# Patient Record
Sex: MALE | Race: ASIAN | NOT HISPANIC OR LATINO | Employment: OTHER | URBAN - METROPOLITAN AREA
[De-identification: names, ages, dates, MRNs, and addresses within clinical notes are randomized per-mention and may not be internally consistent; named-entity substitution may affect disease eponyms.]

---

## 2019-08-23 ENCOUNTER — OFFICE VISIT - HEALTHEAST (OUTPATIENT)
Dept: FAMILY MEDICINE | Facility: CLINIC | Age: 59
End: 2019-08-23

## 2019-08-23 DIAGNOSIS — I25.10 CORONARY ARTERY DISEASE INVOLVING NATIVE CORONARY ARTERY OF NATIVE HEART WITHOUT ANGINA PECTORIS: ICD-10-CM

## 2019-08-23 DIAGNOSIS — R03.0 ELEVATED BP WITHOUT DIAGNOSIS OF HYPERTENSION: ICD-10-CM

## 2019-08-23 DIAGNOSIS — E66.3 OVERWEIGHT: ICD-10-CM

## 2019-08-23 DIAGNOSIS — R73.09 ELEVATED GLUCOSE: ICD-10-CM

## 2019-08-23 DIAGNOSIS — I21.4 MI, ACUTE, NON ST SEGMENT ELEVATION (H): ICD-10-CM

## 2019-08-23 LAB
ALBUMIN SERPL-MCNC: 4 G/DL (ref 3.5–5)
ALP SERPL-CCNC: 86 U/L (ref 45–120)
ALT SERPL W P-5'-P-CCNC: 36 U/L (ref 0–45)
ANION GAP SERPL CALCULATED.3IONS-SCNC: 13 MMOL/L (ref 5–18)
AST SERPL W P-5'-P-CCNC: 21 U/L (ref 0–40)
BILIRUB SERPL-MCNC: 1 MG/DL (ref 0–1)
BUN SERPL-MCNC: 32 MG/DL (ref 8–22)
CALCIUM SERPL-MCNC: 9 MG/DL (ref 8.5–10.5)
CHLORIDE BLD-SCNC: 109 MMOL/L (ref 98–107)
CO2 SERPL-SCNC: 21 MMOL/L (ref 22–31)
CREAT SERPL-MCNC: 1.32 MG/DL (ref 0.7–1.3)
ERYTHROCYTE [DISTWIDTH] IN BLOOD BY AUTOMATED COUNT: 10.8 % (ref 11–14.5)
GFR SERPL CREATININE-BSD FRML MDRD: 55 ML/MIN/1.73M2
GLUCOSE BLD-MCNC: 125 MG/DL (ref 70–125)
HBA1C MFR BLD: 6.4 % (ref 3.5–6)
HCT VFR BLD AUTO: 45.8 % (ref 40–54)
HGB BLD-MCNC: 15.4 G/DL (ref 14–18)
MCH RBC QN AUTO: 31.6 PG (ref 27–34)
MCHC RBC AUTO-ENTMCNC: 33.7 G/DL (ref 32–36)
MCV RBC AUTO: 94 FL (ref 80–100)
PLATELET # BLD AUTO: 231 THOU/UL (ref 140–440)
PMV BLD AUTO: 7.2 FL (ref 7–10)
POTASSIUM BLD-SCNC: 4.5 MMOL/L (ref 3.5–5)
PROT SERPL-MCNC: 7.2 G/DL (ref 6–8)
RBC # BLD AUTO: 4.88 MILL/UL (ref 4.4–6.2)
SODIUM SERPL-SCNC: 143 MMOL/L (ref 136–145)
WBC: 7.4 THOU/UL (ref 4–11)

## 2019-08-23 ASSESSMENT — MIFFLIN-ST. JEOR: SCORE: 1387.55

## 2019-08-26 ENCOUNTER — COMMUNICATION - HEALTHEAST (OUTPATIENT)
Dept: FAMILY MEDICINE | Facility: CLINIC | Age: 59
End: 2019-08-26

## 2019-09-04 ENCOUNTER — OFFICE VISIT - HEALTHEAST (OUTPATIENT)
Dept: INTERNAL MEDICINE | Facility: CLINIC | Age: 59
End: 2019-09-04

## 2019-09-04 DIAGNOSIS — I25.10 CORONARY ARTERY DISEASE INVOLVING NATIVE CORONARY ARTERY OF NATIVE HEART WITHOUT ANGINA PECTORIS: ICD-10-CM

## 2019-09-04 DIAGNOSIS — G43.109 MIGRAINE WITH AURA AND WITHOUT STATUS MIGRAINOSUS, NOT INTRACTABLE: ICD-10-CM

## 2019-09-04 DIAGNOSIS — K21.9 GASTROESOPHAGEAL REFLUX DISEASE WITHOUT ESOPHAGITIS: ICD-10-CM

## 2019-09-04 DIAGNOSIS — Z12.11 SCREEN FOR COLON CANCER: ICD-10-CM

## 2019-09-04 DIAGNOSIS — E78.5 HYPERLIPIDEMIA, UNSPECIFIED HYPERLIPIDEMIA TYPE: ICD-10-CM

## 2019-09-04 DIAGNOSIS — E74.39 GLUCOSE INTOLERANCE: ICD-10-CM

## 2019-09-04 DIAGNOSIS — R03.0 ELEVATED BP WITHOUT DIAGNOSIS OF HYPERTENSION: ICD-10-CM

## 2019-09-04 LAB
ALBUMIN SERPL-MCNC: 3.8 G/DL (ref 3.5–5)
ALP SERPL-CCNC: 83 U/L (ref 45–120)
ALT SERPL W P-5'-P-CCNC: 36 U/L (ref 0–45)
ANION GAP SERPL CALCULATED.3IONS-SCNC: 10 MMOL/L (ref 5–18)
AST SERPL W P-5'-P-CCNC: 25 U/L (ref 0–40)
BILIRUB SERPL-MCNC: 1.3 MG/DL (ref 0–1)
BUN SERPL-MCNC: 22 MG/DL (ref 8–22)
CALCIUM SERPL-MCNC: 9.2 MG/DL (ref 8.5–10.5)
CHLORIDE BLD-SCNC: 107 MMOL/L (ref 98–107)
CO2 SERPL-SCNC: 28 MMOL/L (ref 22–31)
CREAT SERPL-MCNC: 1.12 MG/DL (ref 0.7–1.3)
GFR SERPL CREATININE-BSD FRML MDRD: >60 ML/MIN/1.73M2
GLUCOSE BLD-MCNC: 115 MG/DL (ref 70–125)
HBA1C MFR BLD: 6.2 % (ref 3.5–6)
POTASSIUM BLD-SCNC: 4.5 MMOL/L (ref 3.5–5)
PROT SERPL-MCNC: 6.9 G/DL (ref 6–8)
SODIUM SERPL-SCNC: 145 MMOL/L (ref 136–145)

## 2019-09-04 RX ORDER — ATORVASTATIN CALCIUM 40 MG/1
40 TABLET, FILM COATED ORAL EVERY EVENING
Qty: 30 TABLET | Refills: 11 | Status: SHIPPED | OUTPATIENT
Start: 2019-09-04 | End: 2020-09-04

## 2019-09-04 RX ORDER — CARVEDILOL 12.5 MG/1
12.5 TABLET ORAL 2 TIMES DAILY
Qty: 60 TABLET | Refills: 11 | Status: SHIPPED | OUTPATIENT
Start: 2019-09-04 | End: 2020-09-04

## 2019-09-04 RX ORDER — NITROGLYCERIN 0.4 MG/1
0.4 TABLET SUBLINGUAL EVERY 5 MIN PRN
Qty: 1 BOTTLE | Refills: 5 | Status: SHIPPED | OUTPATIENT
Start: 2019-09-04 | End: 2020-09-04

## 2019-09-04 ASSESSMENT — MIFFLIN-ST. JEOR: SCORE: 1405.01

## 2019-09-05 ENCOUNTER — COMMUNICATION - HEALTHEAST (OUTPATIENT)
Dept: INTERNAL MEDICINE | Facility: CLINIC | Age: 59
End: 2019-09-05

## 2019-12-05 ENCOUNTER — OFFICE VISIT - HEALTHEAST (OUTPATIENT)
Dept: INTERNAL MEDICINE | Facility: CLINIC | Age: 59
End: 2019-12-05

## 2020-09-30 ENCOUNTER — COMMUNICATION - HEALTHEAST (OUTPATIENT)
Dept: INTERNAL MEDICINE | Facility: CLINIC | Age: 60
End: 2020-09-30

## 2020-09-30 DIAGNOSIS — I25.10 CORONARY ARTERY DISEASE INVOLVING NATIVE CORONARY ARTERY OF NATIVE HEART WITHOUT ANGINA PECTORIS: ICD-10-CM

## 2020-10-05 RX ORDER — LISINOPRIL 10 MG/1
TABLET ORAL
Qty: 30 TABLET | Refills: 11 | Status: SHIPPED | OUTPATIENT
Start: 2020-10-05

## 2020-10-05 RX ORDER — ASPIRIN 81 MG/1
TABLET, CHEWABLE ORAL
Qty: 30 TABLET | Refills: 11 | Status: SHIPPED | OUTPATIENT
Start: 2020-10-05

## 2020-10-05 RX ORDER — CLOPIDOGREL BISULFATE 75 MG/1
75 TABLET ORAL DAILY
Qty: 30 TABLET | Refills: 11 | Status: SHIPPED | OUTPATIENT
Start: 2020-10-05 | End: 2021-10-06

## 2021-05-31 NOTE — TELEPHONE ENCOUNTER
----- Message from Christal Sullivan MD sent at 8/26/2019 12:50 PM CDT -----  Please call.  Labs look like he is a little dehydrated.  He should drink a couple of extra glasses of water daily.  This will be rechecked at his next visit.

## 2021-05-31 NOTE — TELEPHONE ENCOUNTER
Upon return call please give pt Dr Sullivan's advise below     Drink a couple extra glasses of water a day and recheck 09/04/19 next scheduled appointment

## 2021-05-31 NOTE — PROGRESS NOTES
Chief Complaint   Patient presents with     Hospital Visit Follow Up     Referral     cardiologist         HPI:   Carson Meeks is a 59 y.o. male with  in for follow up of hospitalization in [place] on 7/25/2019. Was traveling to [place] and hospitalized for heart attack. No previous history of heart problems.  He had two stents placed. Discharged the next day. He had a prolonged episode of what he thought was heartburn.  Has had no follow up until now.   Patient lives here.  Doesn't have any regular care giver.    When walking or moving feels pinching sensation in chest.  Lasts 30 minutes. Stops his activity and it takes 30 minutes to resolve.  No pressure in chest--He did have pressure in chest before the stents were placed.  No tightness in chest.  No heartburn. Gets some shortness of breath with activity--didn't have this before the surgery.  None at rest.  No swelling in legs. Gets pain in legs when walks one hour--relieved with rest.  No swelling in legs. No orthopnea.  No PND.  Has stable nocturia 1-2 times.  Has not used any nitroglycerin.    Has not had previous h/o heart disease, hypertension, diabetes.  Mother's side of family has diabetes.  No family history of heart disease  Long smoking history, smoked 1/2 ppd for years, although did quit for 20 years at one time.  He had been smoking at time of hospitalization, but has since quit.    Taking atorvastatin, lisinopril, plavix, ASA, carvedilol--all started at hospitalization      ROS:  Constitutional: No fatigue, weakness, weight loss or gain, fevers, night sweats   Eyes:  no changes in visual acuity, diplopia or amaurosis, no discharge, matting, redness, tearing or eye pain.    ENT: no hearing loss, ear pain, tinnitus or aural discharge. no sore throat, dental pain, hoarseness, dysphagia, oral or tongue lesions.    no nasal stuffiness, discharge, coryza or bleeding. No sinus pain.   Respiratory: as per HPI   CV: as per HPI  GI:No  "heartburn since hospitalization. Previous episodes of heartburn he associated with diet.  No vomiting.  Occasional diarrhea  : negative   SKIN: negative   MS: negative   NEURO: No symptoms of neurological impairment or TIA's; no amaurosis, diplopia, dysphasia, or unilateral disturbance of motor or sensory function. No loss of balance or vertigo.   HEME/LYMPH: No abnormal bruising or abnormal bleeding. No node swelling.      Medications:  Current Outpatient Medications on File Prior to Visit   Medication Sig Dispense Refill     aspirin 81 mg chewable tablet Chew 81 mg daily.       atorvastatin (LIPITOR) 40 MG tablet Take 40 mg by mouth every evening.       carvedilol (COREG) 12.5 MG tablet Take 12.5 mg by mouth 2 (two) times a day.       clopidogrel (PLAVIX) 75 mg tablet Take 75 mg by mouth daily.       lisinopril (PRINIVIL,ZESTRIL) 10 MG tablet Take 10 mg by mouth daily.       nitroglycerin (NITROSTAT) 0.4 MG SL tablet Place 0.4 mg under the tongue.       No current facility-administered medications on file prior to visit.          Social History:  Social History     Tobacco Use     Smoking status: Former Smoker     Packs/day: 0.50     Years: 20.00     Pack years: 10.00     Last attempt to quit: 2019     Years since quittin.0     Smokeless tobacco: Never Used   Substance Use Topics     Alcohol use: Never     Frequency: Never         Physical Exam:   Vitals:    19 1041   BP: 118/68   Patient Site: Right Arm   Patient Position: Sitting   Cuff Size: Adult Regular   Pulse: 60   Resp: 16   Weight: 160 lb (72.6 kg)   Height: 5' 0.9\" (1.547 m)       GENERAL:   Alert. Oriented.  EYES: Clear  HENT:  Ears: R TM pearly gray. Normal landmarks. L TM pearly gray.  Normal landmarks  Nose: Clear.  Sinuses: Nontender.  Oropharynx:  No erythema. No exudate.  NECK: Supple. No adenopathy.  LUNGS:  Clear to ascultation.  No crackles. Normal symmetric air entry throughout both lung fields. No chest wall deformities or " "tenderness.      HEART:S1 and S2 normal, no murmurs, clicks, gallops or rubs. Regular rate and rhythm. . No JVD. No carotid bruits. Normal radial pulses.   SKIN:  No rash.  ABDOMEN:  +BS. No tenderness. Soft, no guarding, rebound, rigidity,mass, or organomegaly. No CVA tenderness    FEET:  MF TESTING: NL              SKIN EXAM:  NL              VASCULAR:   R DP  PULSE: +                                      L DP  PULSE: +                                      R PT  PULSE: +                                      L PT  PULSE: +     CHART REVIEW  DATE/place of visit: [facility]--[place]; 7/25/2019  DX: Non Stemi MI, elevate BP, Elevated 203  TESTS: ECHO:  Reduced left vetricular systolic function with hypokinesis of basal inferolateral wall EF 55%, moderate left vertricular hypertrophy,  Coronary Cath: Left main 20% osteial stenosis, LAD fills by right to left collaterals, first diagonal moderate diffuse disease-small, left circumflex 80% stenosis,   TREATMENT: left circumflex stenting drug eluting stents..      Assessment/Plan:    1. Coronary artery disease involving native coronary artery of native heart without angina pectoris  HM2(CBC w/o Differential)    Comprehensive Metabolic Panel    Glycosylated Hemoglobin A1c    Ambulatory referral to Cardiology   2. MI, acute, non ST segment elevation (H)     3. Elevated BP without diagnosis of hypertension     4. Elevated glucose     5. Overweight          Follow up of out of state hospitalization for nonstemi MI with stenting of left circumflex artery.  He has been doing well post hospitalization.  Gives history of \"pinching\" in chest but his angina was burning pain in chest.  No signs or symptoms of CHF.  Had elevated BP in hospital--BP today is good.  Had elevated Glucose in hospital--will check A1C.    Continue current medications--these were refilled.  Get started with cardiac rehab.  Referred to cardiology.  Lives closer to [facility] [place] so will establish care there " within a month.            Christal Sullivan MD      8/23/2019    The following portions of the patient's history were reviewed and updated as appropriate: allergies, current medications, past family history, past medical history, past social history, past surgical history and problem list.

## 2021-06-01 NOTE — PROGRESS NOTES
ASSESSMENT AND PLAN:    1. Screen for colon cancer  Refer for colonoscopy.     2. Coronary artery disease with 2 stents  Currently asymptomatic.  Continue present medications.   - nitroglycerin (NITROSTAT) 0.4 MG SL tablet; Place 1 tablet (0.4 mg total) under the tongue every 5 (five) minutes as needed for chest pain.  Dispense: 1 Bottle; Refill: 5  - lisinopril (PRINIVIL,ZESTRIL) 10 MG tablet; Take 1 tablet (10 mg total) by mouth daily.  Dispense: 30 tablet; Refill: 11  - carvedilol (COREG) 12.5 MG tablet; Take 1 tablet (12.5 mg total) by mouth 2 (two) times a day.  Dispense: 60 tablet; Refill: 11  - atorvastatin (LIPITOR) 40 MG tablet; Take 1 tablet (40 mg total) by mouth every evening.  Dispense: 30 tablet; Refill: 11  - clopidogrel (PLAVIX) 75 mg tablet; Take 1 tablet (75 mg total) by mouth daily.  Dispense: 30 tablet; Refill: 11  - aspirin 81 mg chewable tablet; Chew 1 tablet (81 mg total) daily.  Dispense: 30 tablet; Refill: 11  - Comprehensive Metabolic Panel    3. Hyperlipidemia  Continue current treatment.     4. Gastroesophageal reflux disease   Continue current treatment. Symptoms are controlled.     5. Glucose intolerance  Possibly has diabetes mellitus, type 2.  Will need to follow and assess.   - Glycosylated Hemoglobin A1c    6. Elevated BP without diagnosis of hypertension  Continue present medication.    - carvedilol (COREG) 12.5 MG tablet; Take 1 tablet (12.5 mg total) by mouth 2 (two) times a day.  Dispense: 60 tablet; Refill: 11    7. Migraine   Periodic.  No visual aura.  He uses excedrin with ibuprofen, PRN. We discussed the role of caffeine in migraine.      Patient Instructions   1. Continue present medications.     2.  Blood tests today to look for diabetes    3. Follow up in 3 months    4. Be seen sooner if the breathing gets worse.       CHIEF COMPLAINT:  Chief Complaint   Patient presents with     Establish Care     HISTORY OF PRESENT ILLNESS:  Carson Meeks is a 59 y.o. male to  "establish care.  Recent MI and diagnosis of CAD.  Has two stents.  Not having angina.  Not sure he has diabetes.  Did have glucose intolerance in the hospital. GERD symptoms are controlled.    REVIEW OF SYSTEMS:   See HPI, all other systems on review are negative.    Past Medical History:   Diagnosis Date     Coronary artery disease involving native coronary artery without angina pectoris 2019     GERD (gastroesophageal reflux disease)      Glucose intolerance      Hyperlipidemia      Migraine with aura and without status migrainosus, not intractable 2019     Social History     Tobacco Use   Smoking Status Former Smoker     Packs/day: 0.50     Years: 20.00     Pack years: 10.00     Last attempt to quit: 2019     Years since quittin.1   Smokeless Tobacco Never Used     Family History   Problem Relation Age of Onset     Cancer Mother      Stroke Father      Past Surgical History:   Procedure Laterality Date     CORONARY STENT PLACEMENT       VITALS:  Vitals:    19 1011   BP: 118/74   Patient Site: Left Arm   Patient Position: Sitting   Cuff Size: Adult Large   Pulse: (!) 58   SpO2: 96%   Weight: 163 lb 8 oz (74.2 kg)   Height: 5' 1\" (1.549 m)     Wt Readings from Last 3 Encounters:   19 163 lb 8 oz (74.2 kg)   19 160 lb (72.6 kg)     PHYSICAL EXAM:  Constitutional:  In NAD, alert and oriented  Neck: no significant cervical or axillary adenopathy  Cardiac:  S1 S2 without murmur  Lungs: Clear to auscultation  Abdomen:   Soft, flat and non-tender      DECISION TO OBTAIN OLD RECORDS AND/OR OBTAIN HISTORY FROM SOMEONE OTHER THAN PATIENT, AND/OR ACCESSING CARE EVERYWHERE):  1 reviewed cardiology care with MI and stents     REVIEW AND SUMMARIZATION OF OLD RECORDS, AND/OR OBTAINING HISTORY FROM SOMEONE OTHER THAN PATIENT, AND/OR DISCUSSION OF CASE WITH ANOTHER HEALTH CARE PROVIDER:  2 0    REVIEW AND/OR ORDER OF OF CLINICAL LAB TESTS: 1  Recent lab testing, including A1c.     REVIEW AND/OR " ORDER OF RADIOLOGY TESTS: 1 0    REVIEW AND/OR ORDER OF MEDICAL TESTS (EKG/ECHO/COLONOSCOPY/EGD): 1  0    INDEPENDENT  VISUALIZATION OF IMAGE, TRACING, OR SPECIMEN ITSELF (2 EACH): 0     TOTAL: 2    Ventura Stewart MD  Internal Medicine  [facility]

## 2021-06-01 NOTE — PATIENT INSTRUCTIONS - HE
1. Continue present medications.     2.  Blood tests today to look for diabetes    3. Follow up in 3 months    4. Be seen sooner if the breathing gets worse.

## 2021-06-03 VITALS
SYSTOLIC BLOOD PRESSURE: 118 MMHG | WEIGHT: 163.5 LBS | DIASTOLIC BLOOD PRESSURE: 74 MMHG | BODY MASS INDEX: 30.87 KG/M2 | OXYGEN SATURATION: 96 % | HEART RATE: 58 BPM | HEIGHT: 61 IN

## 2021-06-03 VITALS — BODY MASS INDEX: 30.21 KG/M2 | WEIGHT: 160 LBS | HEIGHT: 61 IN

## 2021-06-12 NOTE — TELEPHONE ENCOUNTER
RN cannot approve Refill Request    RN can NOT refill this medication PCP messaged that patient is overdue for Labs. Last office visit: 12/5/2019 Ventura Stewart MD Last Physical: Visit date not found Last MTM visit: Visit date not found Last visit same specialty: 12/5/2019 Ventura Stewart MD.  Next visit within 3 mo: Visit date not found  Next physical within 3 mo: Visit date not found      Tere Noguera, [facility] Triage/Med Refill 10/3/2020    Requested Prescriptions   Pending Prescriptions Disp Refills     lisinopriL (PRINIVIL,ZESTRIL) 10 MG tablet [Pharmacy Med Name: LISINOPRIL 10 MG TABLET 10 Tablet] 30 tablet 11     Sig: TAKE 1 TABLET (10 MG TOTAL) BY MOUTH DAILY FOR HIGH BLOOD PRESSURE       Ace Inhibitors Refill Protocol Failed - 9/30/2020  7:26 AM        Failed - PCP or prescribing provider visit in past 12 months       Last office visit with prescriber/PCP: 12/5/2019 Ventura Stewart MD OR same dept: 12/5/2019 Ventura Stewart MD OR same specialty: 12/5/2019 Ventura Stewart MD  Last physical: Visit date not found Last MTM visit: Visit date not found   Next visit within 3 mo: Visit date not found  Next physical within 3 mo: Visit date not found  Prescriber OR PCP: Ventura Stewart MD  Last diagnosis associated with med order: 1. Coronary artery disease involving native coronary artery of native heart without angina pectoris  - lisinopriL (PRINIVIL,ZESTRIL) 10 MG tablet [Pharmacy Med Name: LISINOPRIL 10 MG TABLET 10 Tablet]; TAKE 1 TABLET (10 MG TOTAL) BY MOUTH DAILY FOR HIGH BLOOD PRESSURE  Dispense: 30 tablet; Refill: 11  - clopidogreL (PLAVIX) 75 mg tablet [Pharmacy Med Name: CLOPIDOGREL 75 MG TABLET 75 Tablet]; Take 1 tablet (75 mg total) by mouth daily.  Dispense: 30 tablet; Refill: 11    If protocol passes may refill for 12 months if within 3 months of last provider visit (or a total of 15 months).             Failed - Serum Potassium in past 12 months     No results found for:  LN-POTASSIUM          Failed - Blood pressure filed in past 12 months     BP Readings from Last 1 Encounters:   09/04/19 118/74             Failed - Serum Creatinine in past 12 months     Creatinine   Date Value Ref Range Status   09/04/2019 1.12 0.70 - 1.30 mg/dL Final                clopidogreL (PLAVIX) 75 mg tablet [Pharmacy Med Name: CLOPIDOGREL 75 MG TABLET 75 Tablet] 30 tablet 11     Sig: TAKE 1 TABLET (75 MG TOTAL) BY MOUTH DAILY.       Clopidogrel/Prasugrel/Ticagrelor Refill Protocol Failed - 9/30/2020  7:26 AM        Failed - PCP or prescribing provider visit in past 6 months       Last office visit with prescriber/PCP: Visit date not found OR same dept: 12/5/2019 Ventura Stewart MD OR same specialty: 12/5/2019 Ventura Stewart MD Last physical: Visit date not found Last MTM visit: Visit date not found     Next appt within 3 mo: Visit date not found  Next physical within 3 mo: Visit date not found  Prescriber OR PCP: Ventura Stewart MD  Last diagnosis associated with med order: 1. Coronary artery disease involving native coronary artery of native heart without angina pectoris  - lisinopriL (PRINIVIL,ZESTRIL) 10 MG tablet [Pharmacy Med Name: LISINOPRIL 10 MG TABLET 10 Tablet]; TAKE 1 TABLET (10 MG TOTAL) BY MOUTH DAILY FOR HIGH BLOOD PRESSURE  Dispense: 30 tablet; Refill: 11  - clopidogreL (PLAVIX) 75 mg tablet [Pharmacy Med Name: CLOPIDOGREL 75 MG TABLET 75 Tablet]; Take 1 tablet (75 mg total) by mouth daily.  Dispense: 30 tablet; Refill: 11    If protocol passes may refill for 6 months if within 3 months of last provider visit (or a total of 9 months).              Failed - Hemoglobin in past 12 months     Hemoglobin   Date Value Ref Range Status   08/23/2019 15.4 14.0 - 18.0 g/dL Final                aspirin 81 mg chewable tablet [Pharmacy Med Name: ASPIRIN 81 MG CHEWABLE TAB 81 Tablet] 30 tablet 11     Sig: CHEW AND SWALLOW 1 PILL (81 MG TOTAL) BY MOUTH DAILY/ TXHUA HNUB XO MOS MOS GRAYSON NQOS 1 LUB        Aspirin/Dipyridamole Refill Protocol Failed - 9/30/2020  7:26 AM        Failed - PCP or prescribing provider visit in past 12 months       Last office visit with prescriber/PCP: 12/5/2019 Ventura Stewart MD OR same dept: 12/5/2019 Ventura Stewart MD OR same specialty: 12/5/2019 Ventura Stewart MD  Last physical: Visit date not found Last MTM visit: Visit date not found    Next appt within 3 mo: Visit date not found Next physical within 3 mo: Visit date not found  Prescriber OR PCP: Ventura Stewart MD  Last diagnosis associated with med order: 1. Coronary artery disease involving native coronary artery of native heart without angina pectoris  - lisinopriL (PRINIVIL,ZESTRIL) 10 MG tablet [Pharmacy Med Name: LISINOPRIL 10 MG TABLET 10 Tablet]; TAKE 1 TABLET (10 MG TOTAL) BY MOUTH DAILY FOR HIGH BLOOD PRESSURE  Dispense: 30 tablet; Refill: 11  - clopidogreL (PLAVIX) 75 mg tablet [Pharmacy Med Name: CLOPIDOGREL 75 MG TABLET 75 Tablet]; Take 1 tablet (75 mg total) by mouth daily.  Dispense: 30 tablet; Refill: 11    If protocol passes may refill for 6 months if within 3 months of last provider visit (or a total of 9 months).

## 2021-06-16 PROBLEM — I25.10 CORONARY ARTERY DISEASE INVOLVING NATIVE CORONARY ARTERY WITHOUT ANGINA PECTORIS: Status: ACTIVE | Noted: 2019-09-04

## 2021-06-16 PROBLEM — G43.109 MIGRAINE WITH AURA AND WITHOUT STATUS MIGRAINOSUS, NOT INTRACTABLE: Status: ACTIVE | Noted: 2019-09-04

## 2021-06-16 PROBLEM — I21.4 MI, ACUTE, NON ST SEGMENT ELEVATION (H): Status: ACTIVE | Noted: 2019-07-25

## 2021-06-19 NOTE — LETTER
Letter by Christal Sullivan MD at      Author: Christal Sullivan MD Service: -- Author Type: --    Filed:  Encounter Date: 8/26/2019 Status: (Other)         Carson Meeks  [address] [place] [address]      August 29, 2019      Dear Mr. Meeks,    Your labs show that you are slightly dehydrated and should drink a few extra glasses of water a day and recheck at next appointment on 09/04/19.    Sincerely,        Electronically signed by Christal Sullivan MD

## 2021-06-19 NOTE — LETTER
Letter by Ventura Stewart MD at      Author: Ventura Stewart MD Service: -- Author Type: --    Filed:  Encounter Date: 9/5/2019 Status: (Other)         Carson Meeks  [address]  [place] [address]     September 5, 2019     Dear Mr. Meeks,    Below are the results from your recent visit:    Resulted Orders   Glycosylated Hemoglobin A1c   Result Value Ref Range    Hemoglobin A1c 6.2 (H) 3.5 - 6.0 %   Comprehensive Metabolic Panel   Result Value Ref Range    Sodium 145 136 - 145 mmol/L    Potassium 4.5 3.5 - 5.0 mmol/L    Chloride 107 98 - 107 mmol/L    CO2 28 22 - 31 mmol/L    Anion Gap, Calculation 10 5 - 18 mmol/L    Glucose 115 70 - 125 mg/dL    BUN 22 8 - 22 mg/dL    Creatinine 1.12 0.70 - 1.30 mg/dL    GFR MDRD Af Amer >60 >60 mL/min/1.73m2    GFR MDRD Non Af Amer >60 >60 mL/min/1.73m2    Bilirubin, Total 1.3 (H) 0.0 - 1.0 mg/dL    Calcium 9.2 8.5 - 10.5 mg/dL    Protein, Total 6.9 6.0 - 8.0 g/dL    Albumin 3.8 3.5 - 5.0 g/dL    Alkaline Phosphatase 83 45 - 120 U/L    AST 25 0 - 40 U/L    ALT 36 0 - 45 U/L    Narrative    Fasting Glucose reference range is 70-99 mg/dL per  American Diabetes Association (ADA) guidelines.       Your tests are good.  The minor abnormalities are not significant.     Please call with questions or contact us using [employer].    Sincerely,        Electronically signed by Ventura Stewart MD

## 2022-03-24 ENCOUNTER — TRANSCRIBE ORDERS (OUTPATIENT)
Dept: OTHER | Age: 62
End: 2022-03-24
Payer: COMMERCIAL

## 2022-03-24 DIAGNOSIS — I20.89 STABLE ANGINA PECTORIS (H): Primary | ICD-10-CM

## 2022-03-31 ENCOUNTER — HOSPITAL ENCOUNTER (OUTPATIENT)
Dept: CARDIAC REHAB | Facility: HOSPITAL | Age: 62
Discharge: HOME | End: 2022-03-31
Attending: INTERNAL MEDICINE
Payer: COMMERCIAL

## 2022-03-31 DIAGNOSIS — I20.89 STABLE ANGINA PECTORIS (H): ICD-10-CM

## 2022-03-31 PROCEDURE — 93798 PHYS/QHP OP CAR RHAB W/ECG: CPT

## 2022-03-31 PROCEDURE — 93797 PHYS/QHP OP CAR RHAB WO ECG: CPT | Mod: XU

## 2022-04-05 ENCOUNTER — HOSPITAL ENCOUNTER (OUTPATIENT)
Dept: CARDIAC REHAB | Facility: HOSPITAL | Age: 62
Discharge: HOME | End: 2022-04-05
Attending: INTERNAL MEDICINE
Payer: COMMERCIAL

## 2022-04-05 PROCEDURE — 93798 PHYS/QHP OP CAR RHAB W/ECG: CPT

## 2022-04-07 ENCOUNTER — HOSPITAL ENCOUNTER (OUTPATIENT)
Dept: CARDIAC REHAB | Facility: HOSPITAL | Age: 62
Discharge: HOME | End: 2022-04-07
Attending: INTERNAL MEDICINE
Payer: COMMERCIAL

## 2022-04-07 PROCEDURE — 93798 PHYS/QHP OP CAR RHAB W/ECG: CPT | Performed by: OCCUPATIONAL THERAPIST

## 2022-04-12 ENCOUNTER — HOSPITAL ENCOUNTER (OUTPATIENT)
Dept: CARDIAC REHAB | Facility: HOSPITAL | Age: 62
Discharge: HOME | End: 2022-04-12
Attending: INTERNAL MEDICINE
Payer: COMMERCIAL

## 2022-04-12 PROCEDURE — 93798 PHYS/QHP OP CAR RHAB W/ECG: CPT

## 2022-04-14 ENCOUNTER — HOSPITAL ENCOUNTER (OUTPATIENT)
Dept: CARDIAC REHAB | Facility: HOSPITAL | Age: 62
Discharge: HOME | End: 2022-04-14
Attending: INTERNAL MEDICINE
Payer: COMMERCIAL

## 2022-04-14 PROCEDURE — 93798 PHYS/QHP OP CAR RHAB W/ECG: CPT

## 2022-04-19 ENCOUNTER — HOSPITAL ENCOUNTER (OUTPATIENT)
Dept: CARDIAC REHAB | Facility: HOSPITAL | Age: 62
Discharge: HOME | End: 2022-04-19
Attending: INTERNAL MEDICINE
Payer: COMMERCIAL

## 2022-04-19 PROCEDURE — 93798 PHYS/QHP OP CAR RHAB W/ECG: CPT

## 2022-04-21 ENCOUNTER — HOSPITAL ENCOUNTER (OUTPATIENT)
Dept: CARDIAC REHAB | Facility: HOSPITAL | Age: 62
Discharge: HOME | End: 2022-04-21
Attending: INTERNAL MEDICINE
Payer: COMMERCIAL

## 2022-04-21 PROCEDURE — 93798 PHYS/QHP OP CAR RHAB W/ECG: CPT

## 2022-04-28 ENCOUNTER — HOSPITAL ENCOUNTER (OUTPATIENT)
Dept: CARDIAC REHAB | Facility: HOSPITAL | Age: 62
Discharge: HOME | End: 2022-04-28
Attending: INTERNAL MEDICINE
Payer: COMMERCIAL

## 2022-04-28 PROCEDURE — 93798 PHYS/QHP OP CAR RHAB W/ECG: CPT

## 2022-05-03 ENCOUNTER — HOSPITAL ENCOUNTER (OUTPATIENT)
Dept: CARDIAC REHAB | Facility: HOSPITAL | Age: 62
Discharge: HOME | End: 2022-05-03
Attending: INTERNAL MEDICINE
Payer: COMMERCIAL

## 2022-05-03 PROCEDURE — 93798 PHYS/QHP OP CAR RHAB W/ECG: CPT

## 2022-05-05 ENCOUNTER — HOSPITAL ENCOUNTER (OUTPATIENT)
Dept: CARDIAC REHAB | Facility: HOSPITAL | Age: 62
Discharge: HOME | End: 2022-05-05
Attending: INTERNAL MEDICINE
Payer: COMMERCIAL

## 2022-05-05 PROCEDURE — 93798 PHYS/QHP OP CAR RHAB W/ECG: CPT

## 2022-05-10 ENCOUNTER — HOSPITAL ENCOUNTER (OUTPATIENT)
Dept: CARDIAC REHAB | Facility: HOSPITAL | Age: 62
Discharge: HOME | End: 2022-05-10
Attending: INTERNAL MEDICINE
Payer: COMMERCIAL

## 2022-05-10 PROCEDURE — 93798 PHYS/QHP OP CAR RHAB W/ECG: CPT

## 2022-05-12 ENCOUNTER — HOSPITAL ENCOUNTER (OUTPATIENT)
Dept: CARDIAC REHAB | Facility: HOSPITAL | Age: 62
Discharge: HOME | End: 2022-05-12
Attending: INTERNAL MEDICINE
Payer: COMMERCIAL

## 2022-05-12 PROCEDURE — 93798 PHYS/QHP OP CAR RHAB W/ECG: CPT

## 2022-05-17 ENCOUNTER — HOSPITAL ENCOUNTER (OUTPATIENT)
Dept: CARDIAC REHAB | Facility: HOSPITAL | Age: 62
Discharge: HOME | End: 2022-05-17
Attending: INTERNAL MEDICINE
Payer: COMMERCIAL

## 2022-05-17 PROCEDURE — 93798 PHYS/QHP OP CAR RHAB W/ECG: CPT

## 2022-05-19 ENCOUNTER — HOSPITAL ENCOUNTER (OUTPATIENT)
Dept: CARDIAC REHAB | Facility: HOSPITAL | Age: 62
Discharge: HOME | End: 2022-05-19
Attending: INTERNAL MEDICINE
Payer: COMMERCIAL

## 2022-05-19 PROCEDURE — 93798 PHYS/QHP OP CAR RHAB W/ECG: CPT

## 2022-05-24 ENCOUNTER — HOSPITAL ENCOUNTER (OUTPATIENT)
Dept: CARDIAC REHAB | Facility: HOSPITAL | Age: 62
Discharge: HOME | End: 2022-05-24
Attending: INTERNAL MEDICINE
Payer: COMMERCIAL

## 2022-05-24 PROCEDURE — 93798 PHYS/QHP OP CAR RHAB W/ECG: CPT

## 2022-05-26 ENCOUNTER — HOSPITAL ENCOUNTER (OUTPATIENT)
Dept: CARDIAC REHAB | Facility: HOSPITAL | Age: 62
Discharge: HOME | End: 2022-05-26
Attending: INTERNAL MEDICINE
Payer: COMMERCIAL

## 2022-05-26 PROCEDURE — 93798 PHYS/QHP OP CAR RHAB W/ECG: CPT

## 2022-05-31 ENCOUNTER — HOSPITAL ENCOUNTER (OUTPATIENT)
Dept: CARDIAC REHAB | Facility: HOSPITAL | Age: 62
Discharge: HOME | End: 2022-05-31
Attending: INTERNAL MEDICINE
Payer: COMMERCIAL

## 2022-05-31 PROCEDURE — 93798 PHYS/QHP OP CAR RHAB W/ECG: CPT

## 2022-06-02 ENCOUNTER — HOSPITAL ENCOUNTER (OUTPATIENT)
Dept: CARDIAC REHAB | Facility: HOSPITAL | Age: 62
Discharge: HOME | End: 2022-06-02
Attending: INTERNAL MEDICINE
Payer: COMMERCIAL

## 2022-06-02 PROCEDURE — 93798 PHYS/QHP OP CAR RHAB W/ECG: CPT

## 2022-06-06 ENCOUNTER — HOSPITAL ENCOUNTER (OUTPATIENT)
Dept: CARDIAC REHAB | Facility: HOSPITAL | Age: 62
Discharge: HOME | End: 2022-06-06
Attending: INTERNAL MEDICINE
Payer: COMMERCIAL

## 2022-06-06 PROCEDURE — 93798 PHYS/QHP OP CAR RHAB W/ECG: CPT

## 2022-06-06 PROCEDURE — 93797 PHYS/QHP OP CAR RHAB WO ECG: CPT

## 2023-03-17 ENCOUNTER — APPOINTMENT (OUTPATIENT)
Dept: INTERPRETER SERVICES | Facility: CLINIC | Age: 63
End: 2023-03-17
Payer: COMMERCIAL

## 2023-03-20 ENCOUNTER — TRANSCRIBE ORDERS (OUTPATIENT)
Dept: OTHER | Age: 63
End: 2023-03-20

## 2023-03-22 ENCOUNTER — TRANSCRIBE ORDERS (OUTPATIENT)
Dept: OTHER | Age: 63
End: 2023-03-22

## 2023-03-22 DIAGNOSIS — Z95.5 S/P CORONARY ARTERY STENT PLACEMENT: Primary | ICD-10-CM

## 2023-03-24 ENCOUNTER — HOSPITAL ENCOUNTER (OUTPATIENT)
Dept: CARDIAC REHAB | Facility: HOSPITAL | Age: 63
Discharge: HOME | End: 2023-03-24
Payer: COMMERCIAL

## 2023-03-24 DIAGNOSIS — Z95.5 S/P CORONARY ARTERY STENT PLACEMENT: ICD-10-CM

## 2023-03-24 PROCEDURE — 93797 PHYS/QHP OP CAR RHAB WO ECG: CPT | Mod: 59

## 2023-03-24 PROCEDURE — 93798 PHYS/QHP OP CAR RHAB W/ECG: CPT

## 2023-03-28 ENCOUNTER — HOSPITAL ENCOUNTER (OUTPATIENT)
Dept: CARDIAC REHAB | Facility: HOSPITAL | Age: 63
Discharge: HOME | End: 2023-03-28
Attending: INTERNAL MEDICINE
Payer: COMMERCIAL

## 2023-03-28 PROCEDURE — 93798 PHYS/QHP OP CAR RHAB W/ECG: CPT

## 2023-03-30 ENCOUNTER — HOSPITAL ENCOUNTER (OUTPATIENT)
Dept: CARDIAC REHAB | Facility: HOSPITAL | Age: 63
Discharge: HOME | End: 2023-03-30
Attending: INTERNAL MEDICINE
Payer: COMMERCIAL

## 2023-03-30 PROCEDURE — 93798 PHYS/QHP OP CAR RHAB W/ECG: CPT

## 2023-04-04 ENCOUNTER — HOSPITAL ENCOUNTER (OUTPATIENT)
Dept: CARDIAC REHAB | Facility: HOSPITAL | Age: 63
Discharge: HOME | End: 2023-04-04
Attending: INTERNAL MEDICINE
Payer: COMMERCIAL

## 2023-04-04 PROCEDURE — 93798 PHYS/QHP OP CAR RHAB W/ECG: CPT

## 2023-04-06 ENCOUNTER — HOSPITAL ENCOUNTER (OUTPATIENT)
Dept: CARDIAC REHAB | Facility: HOSPITAL | Age: 63
Discharge: HOME | End: 2023-04-06
Attending: INTERNAL MEDICINE
Payer: COMMERCIAL

## 2023-04-06 PROCEDURE — 93798 PHYS/QHP OP CAR RHAB W/ECG: CPT

## 2023-04-11 ENCOUNTER — HOSPITAL ENCOUNTER (OUTPATIENT)
Dept: CARDIAC REHAB | Facility: HOSPITAL | Age: 63
Discharge: HOME | End: 2023-04-11
Attending: INTERNAL MEDICINE
Payer: COMMERCIAL

## 2023-04-11 PROCEDURE — 93798 PHYS/QHP OP CAR RHAB W/ECG: CPT

## 2023-04-13 ENCOUNTER — HOSPITAL ENCOUNTER (OUTPATIENT)
Dept: CARDIAC REHAB | Facility: HOSPITAL | Age: 63
Discharge: HOME | End: 2023-04-13
Attending: INTERNAL MEDICINE
Payer: COMMERCIAL

## 2023-04-13 PROCEDURE — 93798 PHYS/QHP OP CAR RHAB W/ECG: CPT | Performed by: OCCUPATIONAL THERAPIST

## 2023-04-18 ENCOUNTER — HOSPITAL ENCOUNTER (OUTPATIENT)
Dept: CARDIAC REHAB | Facility: HOSPITAL | Age: 63
Discharge: HOME | End: 2023-04-18
Attending: INTERNAL MEDICINE
Payer: COMMERCIAL

## 2023-04-18 PROCEDURE — 93798 PHYS/QHP OP CAR RHAB W/ECG: CPT

## 2023-04-20 ENCOUNTER — HOSPITAL ENCOUNTER (OUTPATIENT)
Dept: CARDIAC REHAB | Facility: HOSPITAL | Age: 63
Discharge: HOME | End: 2023-04-20
Attending: INTERNAL MEDICINE
Payer: COMMERCIAL

## 2023-04-20 PROCEDURE — 93798 PHYS/QHP OP CAR RHAB W/ECG: CPT

## 2023-04-25 ENCOUNTER — HOSPITAL ENCOUNTER (OUTPATIENT)
Dept: CARDIAC REHAB | Facility: HOSPITAL | Age: 63
Discharge: HOME | End: 2023-04-25
Attending: INTERNAL MEDICINE
Payer: COMMERCIAL

## 2023-04-25 PROCEDURE — 93798 PHYS/QHP OP CAR RHAB W/ECG: CPT

## 2023-04-27 ENCOUNTER — HOSPITAL ENCOUNTER (OUTPATIENT)
Dept: CARDIAC REHAB | Facility: HOSPITAL | Age: 63
Discharge: HOME | End: 2023-04-27
Attending: INTERNAL MEDICINE
Payer: COMMERCIAL

## 2023-04-27 PROCEDURE — 93798 PHYS/QHP OP CAR RHAB W/ECG: CPT

## 2023-05-02 ENCOUNTER — HOSPITAL ENCOUNTER (OUTPATIENT)
Dept: CARDIAC REHAB | Facility: HOSPITAL | Age: 63
Discharge: HOME | End: 2023-05-02
Attending: INTERNAL MEDICINE
Payer: COMMERCIAL

## 2023-05-02 PROCEDURE — 93798 PHYS/QHP OP CAR RHAB W/ECG: CPT

## 2023-05-04 ENCOUNTER — HOSPITAL ENCOUNTER (OUTPATIENT)
Dept: CARDIAC REHAB | Facility: HOSPITAL | Age: 63
Discharge: HOME | End: 2023-05-04
Attending: INTERNAL MEDICINE
Payer: COMMERCIAL

## 2023-05-04 PROCEDURE — 93798 PHYS/QHP OP CAR RHAB W/ECG: CPT

## 2023-05-09 ENCOUNTER — HOSPITAL ENCOUNTER (OUTPATIENT)
Dept: CARDIAC REHAB | Facility: HOSPITAL | Age: 63
Discharge: HOME | End: 2023-05-09
Attending: INTERNAL MEDICINE
Payer: COMMERCIAL

## 2023-05-09 PROCEDURE — 93798 PHYS/QHP OP CAR RHAB W/ECG: CPT

## 2023-05-11 ENCOUNTER — HOSPITAL ENCOUNTER (OUTPATIENT)
Dept: CARDIAC REHAB | Facility: HOSPITAL | Age: 63
Discharge: HOME | End: 2023-05-11
Attending: INTERNAL MEDICINE
Payer: COMMERCIAL

## 2023-05-11 PROCEDURE — 93798 PHYS/QHP OP CAR RHAB W/ECG: CPT

## 2023-05-16 ENCOUNTER — HOSPITAL ENCOUNTER (OUTPATIENT)
Dept: CARDIAC REHAB | Facility: HOSPITAL | Age: 63
Discharge: HOME | End: 2023-05-16
Attending: INTERNAL MEDICINE
Payer: COMMERCIAL

## 2023-05-16 PROCEDURE — 93798 PHYS/QHP OP CAR RHAB W/ECG: CPT

## 2023-05-18 ENCOUNTER — HOSPITAL ENCOUNTER (OUTPATIENT)
Dept: CARDIAC REHAB | Facility: HOSPITAL | Age: 63
Discharge: HOME | End: 2023-05-18
Attending: INTERNAL MEDICINE
Payer: COMMERCIAL